# Patient Record
Sex: MALE | Race: WHITE | NOT HISPANIC OR LATINO | Employment: FULL TIME | URBAN - METROPOLITAN AREA
[De-identification: names, ages, dates, MRNs, and addresses within clinical notes are randomized per-mention and may not be internally consistent; named-entity substitution may affect disease eponyms.]

---

## 2023-08-08 ENCOUNTER — PROCEDURE VISIT (OUTPATIENT)
Dept: OTOLARYNGOLOGY | Facility: CLINIC | Age: 23
End: 2023-08-08
Payer: COMMERCIAL

## 2023-08-08 DIAGNOSIS — H90.12 CONDUCTIVE HEARING LOSS OF LEFT EAR WITH UNRESTRICTED HEARING OF RIGHT EAR: Primary | ICD-10-CM

## 2023-08-08 PROCEDURE — 92557 COMPREHENSIVE HEARING TEST: CPT | Performed by: AUDIOLOGIST

## 2023-08-08 PROCEDURE — 92567 TYMPANOMETRY: CPT | Performed by: AUDIOLOGIST

## 2023-08-08 NOTE — PROGRESS NOTES
AUDIOMETRIC EVALUATION    Name:  Yogesh Sampson  :  2000  Age:  22 y.o.  Date of Evaluation:  2023     History:  Mr. Sampson is seen today for a hearing evaluation for follow-up PE tube placed at the left ear.    Audiologic Information:  Concerns for Hearing: No  PETs: Yes  Other otologic surgical history: Multiple PE tube  Aural Pressure/Fullness: No  Otalgia: No  Otorrhea: No  Tinnitus: No  Dizziness: No  Noise Exposure: Some  Family history of hearing loss: No  Head trauma requiring hospital stay: No  Chemotherapy: No  Other significant history: No    EVALUATION:    See audiogram    RESULTS:    Otoscopic Evaluation:  Right: Unremarkable  Left: PET Visualized    Tympanometry (226 Hz):  Right: Type A  Left: No seal possible    IMPRESSIONS:  Pure tone thresholds for the right ear shows low normal hearing  Pure tone thresholds for the left ear shows a mild conductive hearing loss  Patient was counseled with regard to the findings.    RECOMMENDATIONS/PLAN:  Follow-up with [facility]  Discussed results and recommendations with patient.      Wesley Justin M.S, [facility]  Licensed Audiologist